# Patient Record
Sex: FEMALE
[De-identification: names, ages, dates, MRNs, and addresses within clinical notes are randomized per-mention and may not be internally consistent; named-entity substitution may affect disease eponyms.]

---

## 2021-05-20 ENCOUNTER — NURSE TRIAGE (OUTPATIENT)
Dept: OTHER | Facility: CLINIC | Age: 66
End: 2021-05-20

## 2021-05-20 NOTE — TELEPHONE ENCOUNTER
C/o 10 days ago gradually got worse; home test for a UTI and was on macrobid and cypro. Stomach ache, back pain, urine odor. Reason for Disposition   [1] Taking antibiotic > 72 hours (3 days) for UTI AND [2] flank or lower back pain not improved   Side (flank) or lower back pain present    Answer Assessment - Initial Assessment Questions  1. ANTIBIOTIC: \"What antibiotic are you taking? \" \"How many times per day? \"      Completed Cipro and macrobid. 2. DURATION: \"When was the antibiotic started? \"      10 days     3. MAIN SYMPTOM: \"What is the main symptom you are concerned about? \"      Back pain and urinary odor. 4. FEVER: \"Do you have a fever? \" If so, ask: \"What is it, how was it measured, and when did it start? \"      No     5. OTHER SYMPTOMS: \"Do you have any other symptoms? \" (e.g., flank pain, vaginal discharge, blood in urine)      Abdominal pain comes and goes. Answer Assessment - Initial Assessment Questions  1. ONSET: \"When did the pain begin? \"       *No Answer*  2. LOCATION: \"Where does it hurt? \" (upper, mid or lower back)      *No Answer*  3. SEVERITY: \"How bad is the pain? \"  (e.g., Scale 1-10; mild, moderate, or severe)    - MILD (1-3): doesn't interfere with normal activities     - MODERATE (4-7): interferes with normal activities or awakens from sleep     - SEVERE (8-10): excruciating pain, unable to do any normal activities       7-8/10 on the worst day, today 3/10  4. PATTERN: \"Is the pain constant? \" (e.g., yes, no; constant, intermittent)       *No Answer*  5. RADIATION: \"Does the pain shoot into your legs or elsewhere? \"      *No Answer*  6. CAUSE:  \"What do you think is causing the back pain? \"       *No Answer*  7. BACK OVERUSE:  Michelle Michelle recent lifting of heavy objects, strenuous work or exercise? \"      *No Answer*  8. MEDICATIONS: \"What have you taken so far for the pain? \" (e.g., nothing, acetaminophen, NSAIDS)      *No Answer*  9.  NEUROLOGIC SYMPTOMS: \"Do you have any weakness, numbness, or problems with bowel/bladder control? \"      *No Answer*  10. OTHER SYMPTOMS: \"Do you have any other symptoms? \" (e.g., fever, abdominal pain, burning with urination, blood in urine)        *No Answer*  11. PREGNANCY: \"Is there any chance you are pregnant? \" (e.g., yes, no; LMP)        *No Answer*    Protocols used: URINARY TRACT INFECTION ON ANTIBIOTIC FOLLOW-UP CALL - FEMALE-ADULT-[facility], URINARY SYMPTOMS-ADULT-[place], BACK PAIN-ADULT-[place]    Brief description of triage: back pain, urinary odor and abdominal pain not relieved by 2 antibiotics. Triage indicates for patient to be seen within the next 24 hours. I gave her [employer] as a resource. Care advice provided, patient verbalizes understanding; denies any other questions or concerns; instructed to call back for any new or worsening symptoms. This triage is a result of a call to [address] [place] [address]. Please do not respond to the triage nurse through this encounter. Any subsequent communication should be directly with the patient.